# Patient Record
Sex: MALE | Race: WHITE | Employment: FULL TIME
[De-identification: names, ages, dates, MRNs, and addresses within clinical notes are randomized per-mention and may not be internally consistent; named-entity substitution may affect disease eponyms.]

---

## 2024-02-12 ENCOUNTER — HOSPITAL ENCOUNTER (OUTPATIENT)
Facility: HOSPITAL | Age: 79
Discharge: HOME | End: 2024-02-15
Attending: STUDENT IN AN ORGANIZED HEALTH CARE EDUCATION/TRAINING PROGRAM
Payer: OTHER GOVERNMENT

## 2024-02-12 DIAGNOSIS — M54.9 BACK PAIN, UNSPECIFIED BACK LOCATION, UNSPECIFIED BACK PAIN LATERALITY, UNSPECIFIED CHRONICITY: ICD-10-CM

## 2024-02-12 PROCEDURE — 72148 MRI LUMBAR SPINE W/O DYE: CPT

## 2024-08-05 NOTE — PROGRESS NOTES
[facility]  [address] [place] [address], [address]  [place] [address]  Phone: [phone]  Fax: [phone]        Nelson Major  : 1945  PCP: Nelson Padilla MD  2024    NEW PATIENT    HISTORY OF PRESENT ILLNESS  Nelson Major is a 79 y.o. male c/o axial low back pain onset ~. Pt notes his feet sometimes falls asleep on him when he is sitting on toilet. Pt notes losing 40 lbs over the last 4 years. Pt notes some gradual weakness in different muscle groups. Pt notes some numbness/tingling in right hand at night and occasionally locks up on him. Pt notes very occasional numbness/tingling in right anterior thigh.    Lumbar MRI images dated 24 were reviewed. 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 Accurate numbering should be confirmed prior to any intervention. Multilevel degenerative disc disease most pronounced with advanced facet arthropathy L4-5 and grade 1 anterolisthesis resulting in moderate to severe spinal stenosis. Multilevel foraminal stenosis most pronounced moderate stenosis due to disc protrusion at right L3-4 with displacement of the foraminal L3 nerve root. Multilevel Schmorl's nodes with associated mild discogenic edema, can be source of pain.      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 Pt is a chronic smoker.     reviewed.    REVIEW OF SYSTEMS  Review of Systems   Constitutional:  Negative for fever and unexpected weight change.   HENT:  Negative for trouble swallowing.    Eyes:  Negative for visual disturbance.   Respiratory:  Negative for shortness of breath.    Cardiovascular:  Negative for chest pain and palpitations.   Gastrointestinal:  Negative for diarrhea, nausea and vomiting.   Genitourinary:  Negative for enuresis.   Musculoskeletal:  Positive for back pain and myalgias.   Skin:  Negative for rash.   Neurological:

## 2024-08-08 ENCOUNTER — OFFICE VISIT (OUTPATIENT)
Age: 79
End: 2024-08-08
Payer: OTHER GOVERNMENT

## 2024-08-08 VITALS
RESPIRATION RATE: 16 BRPM | DIASTOLIC BLOOD PRESSURE: 70 MMHG | SYSTOLIC BLOOD PRESSURE: 136 MMHG | WEIGHT: 132 LBS | HEART RATE: 73 BPM | BODY MASS INDEX: 18.48 KG/M2 | HEIGHT: 71 IN | TEMPERATURE: 97.6 F

## 2024-08-08 DIAGNOSIS — R20.2 NUMBNESS AND TINGLING IN BOTH HANDS: ICD-10-CM

## 2024-08-08 DIAGNOSIS — M47.816 LUMBAR FACET ARTHROPATHY: ICD-10-CM

## 2024-08-08 DIAGNOSIS — M54.50 LUMBAR PAIN: ICD-10-CM

## 2024-08-08 DIAGNOSIS — R29.898 WEAKNESS OF BOTH HANDS: ICD-10-CM

## 2024-08-08 DIAGNOSIS — M54.16 LUMBAR RADICULOPATHY: ICD-10-CM

## 2024-08-08 DIAGNOSIS — M79.18 MYOFASCIAL PAIN: ICD-10-CM

## 2024-08-08 DIAGNOSIS — M48.062 SPINAL STENOSIS OF LUMBAR REGION WITH NEUROGENIC CLAUDICATION: Primary | ICD-10-CM

## 2024-08-08 DIAGNOSIS — R20.0 NUMBNESS AND TINGLING IN BOTH HANDS: ICD-10-CM

## 2024-08-08 DIAGNOSIS — M54.6 THORACIC SPINE PAIN: ICD-10-CM

## 2024-08-08 PROBLEM — Z96.1 PRESENCE OF INTRAOCULAR LENS: Status: ACTIVE | Noted: 2024-08-08

## 2024-08-08 PROBLEM — I25.118 CORONARY ARTERY DISEASE OF NATIVE ARTERY OF NATIVE HEART WITH STABLE ANGINA PECTORIS (HCC): Status: ACTIVE | Noted: 2023-01-18

## 2024-08-08 PROCEDURE — 99204 OFFICE O/P NEW MOD 45 MIN: CPT | Performed by: PHYSICAL MEDICINE & REHABILITATION

## 2024-08-08 PROCEDURE — 1123F ACP DISCUSS/DSCN MKR DOCD: CPT | Performed by: PHYSICAL MEDICINE & REHABILITATION

## 2024-08-08 RX ORDER — ATORVASTATIN CALCIUM 20 MG/1
20 TABLET, FILM COATED ORAL DAILY
COMMUNITY
Start: 2022-09-16

## 2024-08-08 ASSESSMENT — ENCOUNTER SYMPTOMS
BACK PAIN: 1
NAUSEA: 0
SHORTNESS OF BREATH: 0
TROUBLE SWALLOWING: 0
DIARRHEA: 0
VOMITING: 0

## 2024-08-27 ENCOUNTER — HOSPITAL ENCOUNTER (OUTPATIENT)
Facility: HOSPITAL | Age: 79
Setting detail: RECURRING SERIES
Discharge: HOME | End: 2024-08-30
Payer: OTHER GOVERNMENT

## 2024-08-27 PROCEDURE — 97535 SELF CARE MNGMENT TRAINING: CPT

## 2024-08-27 PROCEDURE — 97161 PT EVAL LOW COMPLEX 20 MIN: CPT

## 2024-08-27 NOTE — PROGRESS NOTES
worst   2.  Pt will demonstrate no greater than a 25% deficit in thoracic extension/rotation mobility bilat for improved thoracolumbar mechanics.   Status at IE 50% limited bilat   3.  Pt will improve his Oswestry score to </= 25% limited for indication of an improved pt QOL.   Status at IE 38% limited  4.  Pt will be IND with a finalized HEP for maximal long-term carryover following DC from physical therapy services   Status at IE Initial HEP provided     Frequency / Duration: Patient would benefit from skilled PT 1 times per week for up to 12 sessions as needed in this certification period.    Patient/ Caregiver education and instruction: Diagnosis, prognosis, activity modification and exercises [x]  Plan of care has been reviewed with RICKY Valladares, PT       8/27/2024       2:28 PM  ===================================================================  I certify that the above Therapy Services are being furnished while the patient is under my care. I agree with the treatment plan and certify that this therapy is necessary.    Physician's Signature:_________________________   DATE:_________   TIME:________                           Robert Garcia MD    ** Signature, Date and Time must be completed for valid certification **  [facility] or you may fax the signed copy to [phone].  Thank you.

## 2024-08-27 NOTE — PROGRESS NOTES
PHYSICAL / OCCUPATIONAL THERAPY - DAILY TREATMENT NOTE (updated )  For Eval visit    Patient Name: Nelson Major    Date: 2024    : 1945  Insurance: Payor: [employer] / Plan: [employer] / Product Type: *No Product type* /      Patient  verified yes     Visit #   Current / Total 1 10   Time   In / Out 2:38 3:20   Total Treatment  Time  42   Pain   In / Out 6 5   Subjective Functional Status/Changes: See below     NEXT PROGRESS NOTE DUE: 2024    TREATMENT AREA =  Other low back pain [M54.59]    SUBJECTIVE:  Pt reports he has been dealing with LBP for 4-4.5 years with no known incident. He first noticed it while fishing and standing for long periods. The initial discomfort was significant enough to take his breath away, but it isn't as significant at the moment. No significant h/o N/T in either LE, and he denied any B/B deficits. Tylenol does not seem to alleviate the symptoms to any degree. He has not tried any other conservative therapies up through this point.     Pain levels:  Current: 6/10  At worst: 10/10  At best: 2/10    Co-morbidities: HTN, OA    Allergies: NKA    OBJECTIVE    30 min   Eval - untimed                      Therapeutic Procedures:  Tx Min Billable or 1:1 Min (if diff from Tx Min) Procedure, Rationale, Specifics   12  44193 Self Care/Home Management (timed):  improve patient knowledge and understanding of pain reducing techniques, positioning, posture/ergonomics, activity modification, diagnosis/prognosis, physical therapy expectations, procedures and progression, and home exercises  to improve patient's ability to progress to PLOF and address remaining functional goals.  (see flow sheet as applicable)     Details if applicable:    Initial HEP provided and reviewed in clinic    42 42 [facility] Totals Reminder: bill using total billable min of TIMED therapeutic procedures (example: do not include dry needle or estim unattended, both untimed codes, in totals to left)  8-  strength deficits, analyze and address soft tissue restrictions, analyze and cue for proper movement patterns, and analyze and modify for postural abnormalities to address functional deficits and attain remaining goals.    Progress toward goals / Updated goals:  [x]  See POC    Short Term Goals: To be accomplished in 4 weeks   Pt will be independent and compliant with the initial HEP to promote long-term improvements and symptom relief to maximize carryover following DC.  Status at IE Provided and reviewed in clinic today     Long Term Goals: To be accomplished in 12 treatments   Pt will verbalize pain levels no greater than 3/10 at worst for an improved tolerance to work duties and prolonged standing.   Status at IE 10/10 at worst   2.  Pt will demonstrate no greater than a 25% deficit in thoracic extension/rotation mobility bilat for improved thoracolumbar mechanics.   Status at IE 50% limited bilat   3.  Pt will improve his Oswestry score to </= 25% limited for indication of an improved pt QOL.   Status at IE 38% limited  4.  Pt will be IND with a finalized HEP for maximal long-term carryover following DC from physical therapy services   Status at IE Initial HEP provided     PLAN  yes Continue plan of care  []  Upgrade activities as tolerated  []  Discharge due to :  []  Other:    Justification for Eval Code Complexity:  Patient History : Low complexity  Examination see exam   Clinical Presentation: Low complexity  Clinical Decision Making : Oswestry: 38%    Marcin Valladares PT    8/27/2024    2:31 PM    Future Appointments   Date Time Provider Department Center   8/27/2024  2:40 PM Marcin Valladares PT [facility]   8/28/2024  3:00 PM HBV CT RM 1 [facility] [place]   9/17/2024  2:45 PM Xavier Gomez PA [facility] [place] Sched   10/10/2024  4:45 PM Robert Garcia MD [facility] BS AMB     If an interpreting service was utilized for treatment of this patient, the contents of this document represent the material reviewed with the

## 2024-08-28 ENCOUNTER — HOSPITAL ENCOUNTER (OUTPATIENT)
Facility: HOSPITAL | Age: 79
Discharge: HOME | End: 2024-08-31
Attending: STUDENT IN AN ORGANIZED HEALTH CARE EDUCATION/TRAINING PROGRAM
Payer: OTHER GOVERNMENT

## 2024-08-28 DIAGNOSIS — Z87.891 PERSONAL HISTORY OF TOBACCO USE: ICD-10-CM

## 2024-08-28 PROCEDURE — 71271 CT THORAX LUNG CANCER SCR C-: CPT

## 2024-09-04 ENCOUNTER — HOSPITAL ENCOUNTER (OUTPATIENT)
Facility: HOSPITAL | Age: 79
Setting detail: RECURRING SERIES
Discharge: HOME | End: 2024-09-07
Payer: OTHER GOVERNMENT

## 2024-09-04 PROCEDURE — 97140 MANUAL THERAPY 1/> REGIONS: CPT

## 2024-09-04 PROCEDURE — 97110 THERAPEUTIC EXERCISES: CPT

## 2024-09-04 PROCEDURE — 97530 THERAPEUTIC ACTIVITIES: CPT

## 2024-09-04 NOTE — PROGRESS NOTES
77736 Therapeutic Activity (timed):  use of dynamic activities replicating functional movements to increase ROM, strength, coordination, balance, and proprioception in order to improve patient's ability to progress to PLOF and address remaining functional goals.  (see flow sheet as applicable)     Details if applicable:   LTR with overpressure to the (R) for 30 sec   Open book in sidelying  Cat camel  Prolong thoracic stretch supine on full foam roll   Trunk extension in sitting with 1/2 foam roll      78242 Therapeutic Activity (timed):  use of dynamic activities replicating functional movements to increase ROM, strength, coordination, balance, and proprioception in order to improve patient's ability to progress to PLOF and address remaining functional goals.  (see flow sheet as applicable)     Details if applicable:     52  [facility] Totals Reminder: bill using total billable min of TIMED therapeutic procedures (example: do not include dry needle or estim unattended, both untimed codes, in totals to left)  8-22 min = 1 unit; 23-37 min = 2 units; 38-52 min = 3 units; 53-67 min = 4 units; 68-82 min = 5 units   Total Total     [x]  Patient Education billed concurrently with other procedures   [x] Review HEP    [] Progressed/Changed HEP, detail:    [] Other detail:       Objective Information/Functional Measures/Assessment:  First follow up visit since initial evaluation  Initiated POC per flow sheet   Patient presents with extremely tight and rigid ROM in the thoracic spine, patient seated in kyphosis posture and is unable to achieve full thoracic extension, attempted to incorporate new exercises and activities to assist with increasing flexibility and motion to the thoracic spine to assist with decreasing daily (L) side muscle spasm that patient reports that begins in the a.m. over medial border of the scapula that increases in intensity by early afternoon with his normal work activities that requires patient to have to

## 2024-09-11 ENCOUNTER — HOSPITAL ENCOUNTER (OUTPATIENT)
Facility: HOSPITAL | Age: 79
Setting detail: RECURRING SERIES
Discharge: HOME | End: 2024-09-14
Payer: OTHER GOVERNMENT

## 2024-09-11 PROCEDURE — 97110 THERAPEUTIC EXERCISES: CPT

## 2024-09-11 PROCEDURE — 97530 THERAPEUTIC ACTIVITIES: CPT

## 2024-09-11 PROCEDURE — 97112 NEUROMUSCULAR REEDUCATION: CPT

## 2024-09-18 ENCOUNTER — APPOINTMENT (OUTPATIENT)
Facility: HOSPITAL | Age: 79
End: 2024-09-18
Payer: OTHER GOVERNMENT

## 2024-09-25 ENCOUNTER — HOSPITAL ENCOUNTER (OUTPATIENT)
Facility: HOSPITAL | Age: 79
Setting detail: RECURRING SERIES
Discharge: HOME | End: 2024-09-28
Payer: OTHER GOVERNMENT

## 2024-09-25 PROCEDURE — G0283 ELEC STIM OTHER THAN WOUND: HCPCS

## 2024-09-25 PROCEDURE — 97112 NEUROMUSCULAR REEDUCATION: CPT

## 2024-09-25 PROCEDURE — 97530 THERAPEUTIC ACTIVITIES: CPT

## 2024-10-02 ENCOUNTER — HOSPITAL ENCOUNTER (OUTPATIENT)
Facility: HOSPITAL | Age: 79
Setting detail: RECURRING SERIES
Discharge: HOME | End: 2024-10-05
Payer: OTHER GOVERNMENT

## 2024-10-02 PROCEDURE — 97530 THERAPEUTIC ACTIVITIES: CPT

## 2024-10-02 PROCEDURE — 97112 NEUROMUSCULAR REEDUCATION: CPT

## 2024-10-02 PROCEDURE — 97110 THERAPEUTIC EXERCISES: CPT

## 2024-10-02 NOTE — PROGRESS NOTES
9/25/24 PN: Met; 24%  4.  Pt will be IND with a finalized HEP for maximal long-term carryover following DC from physical therapy services   Status at IE Initial HEP provided     PLAN  [x]  Continue plan of care  [x]  Upgrade activities as tolerated  []  Discharge due to :  []  Other:    Marcin Valladares PT    10/2/2024    4:08 PM    Future Appointments   Date Time Provider Department Center   10/9/2024  3:20 PM Marcin Valladares PT [facility]   10/10/2024  4:45 PM Robert Garcia MD [facility] BS AMB   10/16/2024  3:20 PM Marcin Valladares PT [facility]   10/23/2024  3:20 PM Marcin Valladares, PT [facility]   10/30/2024  3:20 PM Marcin Valladares, PT [facility]     If an interpreting service was utilized for treatment of this patient, the contents of this document represent the material reviewed with the patient via the .

## 2024-10-09 ENCOUNTER — HOSPITAL ENCOUNTER (OUTPATIENT)
Facility: HOSPITAL | Age: 79
Setting detail: RECURRING SERIES
End: 2024-10-09
Payer: OTHER GOVERNMENT

## 2024-10-10 ENCOUNTER — PROCEDURE VISIT (OUTPATIENT)
Age: 79
End: 2024-10-10

## 2024-10-10 VITALS
TEMPERATURE: 98.1 F | WEIGHT: 141 LBS | HEART RATE: 75 BPM | DIASTOLIC BLOOD PRESSURE: 78 MMHG | OXYGEN SATURATION: 95 % | SYSTOLIC BLOOD PRESSURE: 149 MMHG | BODY MASS INDEX: 19.74 KG/M2 | HEIGHT: 71 IN

## 2024-10-10 DIAGNOSIS — R20.0 NUMBNESS AND TINGLING IN BOTH HANDS: ICD-10-CM

## 2024-10-10 DIAGNOSIS — M47.816 LUMBAR FACET ARTHROPATHY: ICD-10-CM

## 2024-10-10 DIAGNOSIS — M54.16 LUMBAR RADICULOPATHY: ICD-10-CM

## 2024-10-10 DIAGNOSIS — R20.2 NUMBNESS AND TINGLING IN BOTH HANDS: ICD-10-CM

## 2024-10-10 DIAGNOSIS — G89.29 CHRONIC PRIMARY MUSCULOSKELETAL PAIN: ICD-10-CM

## 2024-10-10 DIAGNOSIS — R94.131 ABNORMAL EMG: ICD-10-CM

## 2024-10-10 DIAGNOSIS — R29.898 WEAKNESS OF BOTH HANDS: ICD-10-CM

## 2024-10-10 DIAGNOSIS — M79.18 CHRONIC PRIMARY MUSCULOSKELETAL PAIN: ICD-10-CM

## 2024-10-10 DIAGNOSIS — G62.9 NEUROPATHY: ICD-10-CM

## 2024-10-10 DIAGNOSIS — M48.062 SPINAL STENOSIS OF LUMBAR REGION WITH NEUROGENIC CLAUDICATION: Primary | ICD-10-CM

## 2024-10-10 DIAGNOSIS — M54.50 LUMBAR PAIN: ICD-10-CM

## 2024-10-10 DIAGNOSIS — M54.6 THORACIC SPINE PAIN: ICD-10-CM

## 2024-10-10 RX ORDER — PREGABALIN 150 MG/1
150 CAPSULE ORAL 2 TIMES DAILY
Qty: 60 CAPSULE | Refills: 5 | Status: SHIPPED | OUTPATIENT
Start: 2024-10-10 | End: 2025-04-08

## 2024-10-10 NOTE — PROGRESS NOTES
Nelson Major presents today for   Chief Complaint   Patient presents with    Pain     EMG bilateral upper extremities       Is someone accompanying this pt? no    Is the patient using any DME equipment during OV? no    Depression Screening:       No data to display                   No data to display                Learning Assessment:  No question data found.    Abuse Screening:       No data to display                Fall Risk       No data to display                OPIOID RISK TOOL       No data to display                Coordination of Care:  1. Have you been to the ER, urgent care clinic since your last visit? no  Hospitalized since your last visit? no    2. Have you seen or consulted any other health care providers outside of the [facility] since your last visit? no Include any pap smears or colon screening. no  
APB Median C8-T1 Nml Nml Nml Nml 0 Nml Nml 0 Nml Nml     Left Biceps Musculocut C5-C6 Nml Nml Nml Nml 0 Nml Nml 0 Nml Nml     Left Pronator Teres Median C6-C7 Nml Nml Nml Nml 0 Nml Nml 0 Nml Nml     Left Triceps Radial C6-C8 Nml Nml Nml Nml 0 Nml Nml 0 Nml Nml     Left FDI Median,  Ulnar C8-T1 Nml Nml Nml Nml 0 Nml Nml 0 Nml Nml     Left APB Median C8-T1 Nml Nml Nml Nml 0 Nml Nml 0 Nml Nml             Waveforms:    Motor                F-Wave         Sensory                              [facility]  OFFICE PROCEDURE PROGRESS NOTE           Chart reviewed for the following:              Robert THAYER, have reviewed the History, Physical and updated the Allergic reactions for Nelson Major     TIME OUT performed immediately prior to start of procedure:              Robert THAYER, have performed the following reviews on Nelson Major prior to the start of the procedure:            * Patient was identified by name and date of birth   * Agreement on procedure being performed was verified  * Risks and Benefits explained to the patient  * Procedure site verified and marked as necessary  * Patient was positioned for comfort  * Consent was signed and verified                Time:  5:38 PM EDT      Date of procedure: 10/10/2024      Procedure performed by:  Robert Garcia MD     Patient accompanied by:  Self    How tolerated by patient: tolerated the procedure well with no complications    Post Procedural Pain Scale: 0 - No Hurt    Comments: none     By signing my name below, ILuis SCRIBE, attest that this documentation has been prepared under the direction and in the presence of Robert Garcia MD  Electronically signed: ADAM Lilly. 10/10/24 5:45 PM EDT     Robert THAYER MD, personally performed the services described in this documentation. I have authorized the eranibmaury to complete the medical record entries input within this chart. I have

## 2024-10-12 ENCOUNTER — HOSPITAL ENCOUNTER (OUTPATIENT)
Facility: HOSPITAL | Age: 79
Discharge: HOME | End: 2024-10-15
Payer: OTHER GOVERNMENT

## 2024-10-12 LAB
25(OH)D3 SERPL-MCNC: 46.7 NG/ML (ref 30–100)
ALBUMIN SERPL-MCNC: 3.7 G/DL (ref 3.4–5)
ALBUMIN/GLOB SERPL: 1 (ref 0.8–1.7)
ALP SERPL-CCNC: 74 U/L (ref 45–117)
ALT SERPL-CCNC: 18 U/L (ref 16–61)
ANION GAP SERPL CALC-SCNC: 8 MMOL/L (ref 3–18)
AST SERPL-CCNC: 17 U/L (ref 10–38)
BASOPHILS # BLD: 0.1 K/UL (ref 0–0.1)
BASOPHILS NFR BLD: 1 % (ref 0–2)
BILIRUB SERPL-MCNC: 0.7 MG/DL (ref 0.2–1)
BUN SERPL-MCNC: 21 MG/DL (ref 7–18)
BUN/CREAT SERPL: 23 (ref 12–20)
CALCIUM SERPL-MCNC: 9.1 MG/DL (ref 8.5–10.1)
CHLORIDE SERPL-SCNC: 104 MMOL/L (ref 100–111)
CHOLEST SERPL-MCNC: 212 MG/DL
CO2 SERPL-SCNC: 27 MMOL/L (ref 21–32)
CREAT SERPL-MCNC: 0.91 MG/DL (ref 0.6–1.3)
DIFFERENTIAL METHOD BLD: ABNORMAL
EOSINOPHIL # BLD: 0.3 K/UL (ref 0–0.4)
EOSINOPHIL NFR BLD: 3 % (ref 0–5)
ERYTHROCYTE [DISTWIDTH] IN BLOOD BY AUTOMATED COUNT: 12.6 % (ref 11.6–14.5)
GLOBULIN SER CALC-MCNC: 3.6 G/DL (ref 2–4)
GLUCOSE SERPL-MCNC: 107 MG/DL (ref 74–99)
HCT VFR BLD AUTO: 50 % (ref 36–48)
HDLC SERPL-MCNC: 99 MG/DL (ref 40–60)
HDLC SERPL: 2.1 (ref 0–5)
HGB BLD-MCNC: 16.6 G/DL (ref 13–16)
IMM GRANULOCYTES # BLD AUTO: 0 K/UL (ref 0–0.04)
IMM GRANULOCYTES NFR BLD AUTO: 0 % (ref 0–0.5)
LDLC SERPL CALC-MCNC: 101.4 MG/DL (ref 0–100)
LIPID PANEL: ABNORMAL
LYMPHOCYTES # BLD: 1.6 K/UL (ref 0.9–3.6)
LYMPHOCYTES NFR BLD: 17 % (ref 21–52)
MCH RBC QN AUTO: 31.7 PG (ref 24–34)
MCHC RBC AUTO-ENTMCNC: 33.2 G/DL (ref 31–37)
MCV RBC AUTO: 95.6 FL (ref 78–100)
MONOCYTES # BLD: 0.9 K/UL (ref 0.05–1.2)
MONOCYTES NFR BLD: 10 % (ref 3–10)
NEUTS SEG # BLD: 6.5 K/UL (ref 1.8–8)
NEUTS SEG NFR BLD: 69 % (ref 40–73)
NRBC # BLD: 0 K/UL (ref 0–0.01)
NRBC BLD-RTO: 0 PER 100 WBC
PLATELET # BLD AUTO: 282 K/UL (ref 135–420)
PMV BLD AUTO: 10.4 FL (ref 9.2–11.8)
POTASSIUM SERPL-SCNC: 4.1 MMOL/L (ref 3.5–5.5)
PROT SERPL-MCNC: 7.3 G/DL (ref 6.4–8.2)
PSA SERPL-MCNC: 2.2 NG/ML (ref 0–4)
RBC # BLD AUTO: 5.23 M/UL (ref 4.35–5.65)
SODIUM SERPL-SCNC: 139 MMOL/L (ref 136–145)
TRIGL SERPL-MCNC: 58 MG/DL
TSH SERPL DL<=0.05 MIU/L-ACNC: 0.48 UIU/ML (ref 0.36–3.74)
VLDLC SERPL CALC-MCNC: 11.6 MG/DL
WBC # BLD AUTO: 9.4 K/UL (ref 4.6–13.2)

## 2024-10-12 PROCEDURE — 80061 LIPID PANEL: CPT

## 2024-10-12 PROCEDURE — 84443 ASSAY THYROID STIM HORMONE: CPT

## 2024-10-12 PROCEDURE — 84153 ASSAY OF PSA TOTAL: CPT

## 2024-10-12 PROCEDURE — 82306 VITAMIN D 25 HYDROXY: CPT

## 2024-10-12 PROCEDURE — 80053 COMPREHEN METABOLIC PANEL: CPT

## 2024-10-12 PROCEDURE — 36415 COLL VENOUS BLD VENIPUNCTURE: CPT

## 2024-10-12 PROCEDURE — 85025 COMPLETE CBC W/AUTO DIFF WBC: CPT

## 2024-10-16 ENCOUNTER — APPOINTMENT (OUTPATIENT)
Facility: HOSPITAL | Age: 79
End: 2024-10-16
Payer: OTHER GOVERNMENT

## 2024-10-23 ENCOUNTER — APPOINTMENT (OUTPATIENT)
Facility: HOSPITAL | Age: 79
End: 2024-10-23
Payer: OTHER GOVERNMENT

## 2024-10-30 ENCOUNTER — APPOINTMENT (OUTPATIENT)
Facility: HOSPITAL | Age: 79
End: 2024-10-30
Payer: OTHER GOVERNMENT

## 2025-03-31 ENCOUNTER — OFFICE VISIT (OUTPATIENT)
Age: 80
End: 2025-03-31
Payer: OTHER GOVERNMENT

## 2025-03-31 VITALS
TEMPERATURE: 98.6 F | HEART RATE: 73 BPM | OXYGEN SATURATION: 94 % | RESPIRATION RATE: 18 BRPM | WEIGHT: 148 LBS | BODY MASS INDEX: 20.72 KG/M2 | HEIGHT: 71 IN

## 2025-03-31 DIAGNOSIS — M48.062 SPINAL STENOSIS OF LUMBAR REGION WITH NEUROGENIC CLAUDICATION: Primary | ICD-10-CM

## 2025-03-31 DIAGNOSIS — M79.672 PAIN OF LEFT HEEL: ICD-10-CM

## 2025-03-31 DIAGNOSIS — M54.50 LUMBAR PAIN: ICD-10-CM

## 2025-03-31 DIAGNOSIS — M47.816 LUMBAR FACET ARTHROPATHY: ICD-10-CM

## 2025-03-31 DIAGNOSIS — M54.16 LUMBAR RADICULOPATHY: ICD-10-CM

## 2025-03-31 DIAGNOSIS — D49.1 LUNG TUMOR: ICD-10-CM

## 2025-03-31 PROCEDURE — 99214 OFFICE O/P EST MOD 30 MIN: CPT | Performed by: NURSE PRACTITIONER

## 2025-03-31 PROCEDURE — 1123F ACP DISCUSS/DSCN MKR DOCD: CPT | Performed by: NURSE PRACTITIONER

## 2025-03-31 RX ORDER — PREGABALIN 225 MG/1
225 CAPSULE ORAL 2 TIMES DAILY
Qty: 60 CAPSULE | Refills: 2 | Status: SHIPPED | OUTPATIENT
Start: 2025-03-31 | End: 2025-06-29

## 2025-03-31 NOTE — PROGRESS NOTES
[facility] Chief complaint   Chief Complaint   Patient presents with    Back Problem    Pain    Back Pain       History of Present Illness:  Nelson Major is a  80 y.o.  male who comes in today after seeing Dr. Garcia in October 10/10/24.  He states he continues to have low back pain without radicular pain.  He is taking Lyrica 150 mg twice a day which does take the edge off the pain but he still has significant pain.  He also is complaining of some left heel pain that started about 2 weeks ago without injury.  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  That CT scan of the lung was done in September and he states his PCP told him he needed a biopsy and was supposed to be set him up for a biopsy but they never got back with him.  He states he saw his PCP after that and still nothing was done about it.  He said his PCP said [facility] was [facility] and give [facility] a [facility].  He denies fever bowel bladder dysfunction.      Physical Exam: Patient is a 80-year-old male well-developed well-nourished who is alert and oriented with a normal mood and affect.  He has a full weightbearing antalgic gait.  Not using assist device.  Is 4-5 strength bilateral lower extremities.  Negative straight leg raise.  He has pain with hyperextension lumbar spine.      Assessment and Plan: This is a patient who has lumbar facet arthropathy with synovial cyst and some stenosis.  I will increase his Lyrica to 225 mg twice a day.  I will refer him to Dr. Grimes for his left heel pain.  I will also refer him to pulmonary to evaluate that tumor.  I did walk over across the [place] to the pulmonary office and spoke with the manager he was able to get him an appointment on Monday at 9 AM.  The manager came over to our office and escorted him to their office to get that appointment set up.  We will see him back with Dr. Rodgers in 2 months for his back pain and effectiveness of

## 2025-03-31 NOTE — PROGRESS NOTES
Nelson Major presents today for   Chief Complaint   Patient presents with    Back Problem    Pain    Back Pain       Is someone accompanying this pt? no    Is the patient using any DME equipment during OV? no    Depression Screening:       No data to display                Learning Assessment:  Failed to redirect to the Timeline version of the [employer].    Abuse Screening:       No data to display                Fall Risk  Failed to redirect to the Timeline version of the [employer].    OPIOID RISK TOOL  Failed to redirect to the Timeline version of the [employer].    Coordination of Care:  1. Have you been to the ER, urgent care clinic since your last visit? no  Hospitalized since your last visit? no    2. Have you seen or consulted any other health care providers outside of the [facility] since your last visit? no Include any pap smears or colon screening. no

## 2025-04-02 ENCOUNTER — OFFICE VISIT (OUTPATIENT)
Age: 80
End: 2025-04-02
Payer: OTHER GOVERNMENT

## 2025-04-02 ENCOUNTER — TELEPHONE (OUTPATIENT)
Facility: HOSPITAL | Age: 80
End: 2025-04-02

## 2025-04-02 VITALS
TEMPERATURE: 98.9 F | DIASTOLIC BLOOD PRESSURE: 60 MMHG | BODY MASS INDEX: 20.94 KG/M2 | SYSTOLIC BLOOD PRESSURE: 131 MMHG | WEIGHT: 149.6 LBS | HEART RATE: 76 BPM | RESPIRATION RATE: 16 BRPM | OXYGEN SATURATION: 98 % | HEIGHT: 71 IN

## 2025-04-02 DIAGNOSIS — R93.89 ABNORMAL CT OF THE CHEST: ICD-10-CM

## 2025-04-02 DIAGNOSIS — Z72.0 TOBACCO ABUSE: ICD-10-CM

## 2025-04-02 DIAGNOSIS — R91.1 PULMONARY NODULE: Primary | ICD-10-CM

## 2025-04-02 DIAGNOSIS — M79.89 LEG SWELLING: ICD-10-CM

## 2025-04-02 DIAGNOSIS — J43.2 CENTRILOBULAR EMPHYSEMA (HCC): ICD-10-CM

## 2025-04-02 PROCEDURE — 99406 BEHAV CHNG SMOKING 3-10 MIN: CPT | Performed by: INTERNAL MEDICINE

## 2025-04-02 PROCEDURE — 1123F ACP DISCUSS/DSCN MKR DOCD: CPT | Performed by: INTERNAL MEDICINE

## 2025-04-02 PROCEDURE — 99204 OFFICE O/P NEW MOD 45 MIN: CPT | Performed by: INTERNAL MEDICINE

## 2025-04-02 RX ORDER — NICOTINE 21 MG/24HR
1 PATCH, TRANSDERMAL 24 HOURS TRANSDERMAL DAILY
Qty: 14 PATCH | Refills: 0 | Status: SHIPPED | OUTPATIENT
Start: 2025-05-15 | End: 2025-05-29

## 2025-04-02 RX ORDER — NICOTINE 21 MG/24HR
1 PATCH, TRANSDERMAL 24 HOURS TRANSDERMAL DAILY
Qty: 42 PATCH | Refills: 0 | Status: SHIPPED | OUTPATIENT
Start: 2025-04-02 | End: 2025-05-14

## 2025-04-02 NOTE — PATIENT INSTRUCTIONS
What is the plan?  -Complete lung biopsy    -Complete PET/CT scan    -Complete Ultrasound of your left leg    -Complete lung function test and 6-walk test    -Stop use of ALL tobacco products  -Start on Nicotine patches

## 2025-04-02 NOTE — TELEPHONE ENCOUNTER
Pt called back and confirmed appt date and time. Will arrive at 9am 4/14/25 main entrance. Will stop aspirin on 4/11/25. SB 4/2/25

## 2025-04-29 ENCOUNTER — HOSPITAL ENCOUNTER (OUTPATIENT)
Facility: HOSPITAL | Age: 80
Discharge: HOME | End: 2025-05-02
Payer: OTHER GOVERNMENT

## 2025-04-29 LAB
CALCIUM SERPL-MCNC: 9.1 MG/DL (ref 8.5–10.1)
CK SERPL-CCNC: 27 U/L (ref 39–308)
PTH-INTACT SERPL-MCNC: 39.4 PG/ML (ref 15–65)
TSH, 3RD GENERATION: 0.53 UIU/ML (ref 0.27–4.2)
VIT B12 SERPL-MCNC: 740 PG/ML (ref 211–911)

## 2025-04-29 PROCEDURE — 82607 VITAMIN B-12: CPT

## 2025-04-29 PROCEDURE — 84443 ASSAY THYROID STIM HORMONE: CPT

## 2025-04-29 PROCEDURE — 83970 ASSAY OF PARATHORMONE: CPT

## 2025-04-29 PROCEDURE — 84425 ASSAY OF VITAMIN B-1: CPT

## 2025-04-29 PROCEDURE — 82784 ASSAY IGA/IGD/IGG/IGM EACH: CPT

## 2025-04-29 PROCEDURE — 83520 IMMUNOASSAY QUANT NOS NONAB: CPT

## 2025-04-29 PROCEDURE — 83921 ORGANIC ACID SINGLE QUANT: CPT

## 2025-04-29 PROCEDURE — 82550 ASSAY OF CK (CPK): CPT

## 2025-04-29 PROCEDURE — 84630 ASSAY OF ZINC: CPT

## 2025-04-29 PROCEDURE — 82525 ASSAY OF COPPER: CPT

## 2025-04-29 PROCEDURE — 82390 ASSAY OF CERULOPLASMIN: CPT

## 2025-04-29 PROCEDURE — 86334 IMMUNOFIX E-PHORESIS SERUM: CPT

## 2025-04-29 PROCEDURE — 36415 COLL VENOUS BLD VENIPUNCTURE: CPT

## 2025-04-30 LAB — PERIPHERAL SMEAR, MD REVIEW: NORMAL

## 2025-05-01 LAB — CERULOPLASMIN SERPL-MCNC: 24.7 MG/DL (ref 16–31)

## 2025-05-02 LAB
COPPER SERPL-MCNC: 98 UG/DL (ref 69–132)
IGA SERPL-MCNC: 740 MG/DL (ref 61–437)
IGG SERPL-MCNC: 976 MG/DL (ref 603–1613)
IGM SERPL-MCNC: 78 MG/DL (ref 15–143)
PROT PATTERN SERPL IFE-IMP: ABNORMAL
ZINC SERPL-MCNC: 55 UG/DL (ref 44–115)

## 2025-05-03 LAB
METHYLMALONATE SERPL-SCNC: 186 NMOL/L (ref 0–378)
VIT B1 BLD-SCNC: 165.4 NMOL/L (ref 66.5–200)

## 2025-05-06 ENCOUNTER — TELEPHONE (OUTPATIENT)
Facility: HOSPITAL | Age: 80
End: 2025-05-06

## 2025-05-06 NOTE — TELEPHONE ENCOUNTER
Spk to pt for him to be aware that we rescheduled his CT Lung BX and he sts that the [facility] hasn't approved the procedure. He has left VM with them and will let us know if he hears back but will keep the appt on 5/20/25. SB 5/6/25 10:07am

## 2025-05-06 NOTE — TELEPHONE ENCOUNTER
Spk to Janis @  checking on the [facility] authorization for procedure. She's going to reach out to the [facility] then contact patient. ANA 5/6/25 @10:18am

## 2025-05-08 LAB
CA6 IGA SER IA-ACNC: 10.52 EU/ML
CA6 IGG SER IA-ACNC: <1 EU/ML
CA6 IGM SER IA-ACNC: 1.22 EU/ML
Lab: NEGATIVE
Lab: NORMAL
PSP IGA SER IA-ACNC: 14.24 EU/ML
PSP IGG SER IA-ACNC: 8.5 EU/ML
PSP IGM SER IA-ACNC: <1 EU/ML
REFERENCE RANGE: NORMAL
SP-1 IGA SER IA-ACNC: 12.99 EU/ML
SP-1 IGG SER IA-ACNC: 4.49 EU/ML
SP-1 IGM SER IA-ACNC: <1 EU/ML

## 2025-05-28 ENCOUNTER — OFFICE VISIT (OUTPATIENT)
Age: 80
End: 2025-05-28
Payer: OTHER GOVERNMENT

## 2025-05-28 VITALS
OXYGEN SATURATION: 95 % | DIASTOLIC BLOOD PRESSURE: 63 MMHG | SYSTOLIC BLOOD PRESSURE: 133 MMHG | TEMPERATURE: 97.8 F | BODY MASS INDEX: 20.44 KG/M2 | HEART RATE: 75 BPM | WEIGHT: 146 LBS | HEIGHT: 71 IN

## 2025-05-28 DIAGNOSIS — M54.16 LUMBAR RADICULOPATHY: ICD-10-CM

## 2025-05-28 DIAGNOSIS — G62.9 NEUROPATHY: ICD-10-CM

## 2025-05-28 DIAGNOSIS — M48.062 SPINAL STENOSIS OF LUMBAR REGION WITH NEUROGENIC CLAUDICATION: Primary | ICD-10-CM

## 2025-05-28 DIAGNOSIS — M47.816 LUMBAR FACET ARTHROPATHY: ICD-10-CM

## 2025-05-28 DIAGNOSIS — M54.6 THORACIC SPINE PAIN: ICD-10-CM

## 2025-05-28 DIAGNOSIS — G89.29 CHRONIC PRIMARY MUSCULOSKELETAL PAIN: ICD-10-CM

## 2025-05-28 DIAGNOSIS — M79.18 CHRONIC PRIMARY MUSCULOSKELETAL PAIN: ICD-10-CM

## 2025-05-28 DIAGNOSIS — M54.50 LUMBAR PAIN: ICD-10-CM

## 2025-05-28 PROCEDURE — 1123F ACP DISCUSS/DSCN MKR DOCD: CPT | Performed by: PHYSICAL MEDICINE & REHABILITATION

## 2025-05-28 PROCEDURE — 99213 OFFICE O/P EST LOW 20 MIN: CPT | Performed by: PHYSICAL MEDICINE & REHABILITATION

## 2025-05-28 RX ORDER — PREGABALIN 150 MG/1
150 CAPSULE ORAL 2 TIMES DAILY
Qty: 180 CAPSULE | Refills: 1 | Status: SHIPPED | OUTPATIENT
Start: 2025-05-28 | End: 2025-11-24

## 2025-05-28 RX ORDER — LIDOCAINE 50 MG/G
1 PATCH TOPICAL DAILY
Qty: 30 PATCH | Refills: 5 | Status: SHIPPED | OUTPATIENT
Start: 2025-05-28 | End: 2025-11-24

## 2025-05-28 ASSESSMENT — ENCOUNTER SYMPTOMS
NAUSEA: 0
SHORTNESS OF BREATH: 0
DIARRHEA: 0
TROUBLE SWALLOWING: 0
VOMITING: 0
BACK PAIN: 1

## 2025-05-28 NOTE — PROGRESS NOTES
Nelson Major presents today for   Chief Complaint   Patient presents with    Lower Back Pain       Is someone accompanying this pt? no    Is the patient using any DME equipment during OV? no      Coordination of Care:  1. Have you been to the ER, urgent care clinic since your last visit? Yes, pt went to the ER for COPD  Hospitalized since your last visit? no    2. Have you seen or consulted any other health care providers outside of the [facility] since your last visit? Yes, pulmonology Include any pap smears or colon screening. no        
sacralized L5.  Accurate numbering should be confirmed prior to any intervention.     Multilevel degenerative disc disease most pronounced with advanced facet  arthropathy L4-5 and grade 1 anterolisthesis resulting in moderate to severe  spinal stenosis.  -Multilevel foraminal stenosis most pronounced moderate stenosis due to disc  protrusion at right L3-4 with displacement of the foraminal L3 nerve root.     Multilevel Schmorl's nodes with associated mild discogenic edema, can be source  of pain.    12 minutes of face-to-face contact were spent with the patient during today's visit extensively discussing symptoms and treatment plan.  All questions were answered. More than half of this visit today was spent on counseling.      By signing my name below, Luis THAYER SCRIBE, attest that this documentation has been prepared under the direction and in the presence of Robert Garcia MD  Electronically signed: ADAM Lilly. 5/28/25 9:24 AM EDT     Robert THAYER MD, personally performed the services described in this documentation. I have authorized the scribe to complete the medical record entries input within this chart. I have reviewed the chart and agree that the record reflects my personal performance and is accurate and complete. [Electronically Signed: Robert Garcia MD. 5/28/2025 12:01 PM]